# Patient Record
Sex: FEMALE | Race: WHITE | NOT HISPANIC OR LATINO | URBAN - METROPOLITAN AREA
[De-identification: names, ages, dates, MRNs, and addresses within clinical notes are randomized per-mention and may not be internally consistent; named-entity substitution may affect disease eponyms.]

---
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
## 2023-09-26 NOTE — ED PROVIDER NOTE - TOBACCO USE
Immunizations Administered       Name Date Dose Route    Influenza, FLUAD, (age 72 y+), Adjuvanted, 0.5mL 9/26/2023 0.5 mL Intramuscular    Site: Deltoid- Left    Lot: [id number]    [address]: 76062-453-62 Never smoker

## 2023-10-06 ENCOUNTER — NON-APPOINTMENT (OUTPATIENT)
Age: 23
End: 2023-10-06

## 2024-02-07 NOTE — ED ADULT NURSE NOTE - NS PRO PASSIVE SMOKE EXP
Patient: Kathi Enriquez    Procedure: * No procedures listed *       Anesthesia Type:  Epidural    Note:     Postop Pain Control: Uneventful            Sign Out: Well controlled pain   PONV: No   Neuro/Psych: Uneventful            Sign Out: Acceptable/Baseline neuro status   Airway/Respiratory: Uneventful            Sign Out: Acceptable/Baseline resp. status   CV/Hemodynamics: Uneventful            Sign Out: Acceptable CV status; No obvious hypovolemia; No obvious fluid overload   Other NRE: NONE   DID A NON-ROUTINE EVENT OCCUR? No           Last vitals:  Vitals:    02/07/24 1430 02/07/24 1456 02/07/24 1600   BP:   113/69   Pulse:   77   Resp: 20 20 18   Temp:   36.6  C (97.8  F)   SpO2:          Electronically Signed By: Jez Ward MD  February 7, 2024  5:53 PM  
No